# Patient Record
(demographics unavailable — no encounter records)

---

## 2025-01-16 NOTE — HISTORY OF PRESENT ILLNESS
[FreeTextEntry1] : Ms. Donovan is a 74 year old female with a history of abnormal CAT scan, asthma, cough, GERD, LPR, LOREE, RLS, SOB, and tracheomalacia presenting to the office today for a follow up visit. Her chief complaint is   -she notes going to the hospital a few months ago due to dehydration  -she notes having palpitations and having to see a cardiologist  -she notes being hospitalized one month ago  -she notes having a CT done of her chest  -she notes being on a saline drip  -she notes feeling improved since  -she notes appetite is stable -she notes diet is good -she notes good quality of sleep -she notes sleeping for 9 hours  -she notes balance is stable -she notes going to PT  -she notes fatigue   -she denies any headaches, nausea, emesis, fever, chills, sweats, chest pain, chest pressure, coughing, wheezing, diarrhea, constipation, dysphagia, arthralgias, myalgias, leg swelling, itchy eyes, itchy ears, heartburn, reflux, or sour taste in the mouth.

## 2025-01-16 NOTE — ADDENDUM
[FreeTextEntry1] : Documented by Raghavendra Santos acting as a scribe for Dr. Morales Juarez on 01/16/2025. All medical record entries made by the Scribe were at my, Dr. Morales Juarez's, direction and personally dictated by me on 01/16/2025. I have reviewed the chart and agree that the record accurately reflects my personal performance of the history, physical exam, assessment and plan. I have also personally directed, reviewed, and agree with the discharge instructions.

## 2025-01-16 NOTE — ASSESSMENT
[FreeTextEntry1] : Ms. Donovan is a 74 year old female who has a history of hypothyroidism, ?osteopenia, asthma, allergies, GERD, bronchomalacia, obesity, Covid 19 5/2022, and is currently stable from a pulmonary perspective. s/p post fall / poor balance - s/p vertigo illness 12/2024 - stable   Her shortness of breath is multifactorial due to: -tracheomalacia, s/p tracheoplasty -asthma -obesity / out of shape -secondary reflux / GERD -persistent cough  Her cough is felt to be related to: -tracheomalacia -asthma -secondary GERD -post nasal drip syndrome  problem 1: asthma (Active s/p Sinusitis) - resolved  -s/p a short course of Prednisone; 20 mg for 7 days, then 10 mg for 7 days (6/2021), 5/2024 Information sheet given to the patient to be reviewed, this medication is never to be used without consulting the prescribing physician. Proper dietary restraint is necessary specifically salt containing foods, if any reaction may occur should be reported. -continue Breo Ellipta 200 at 1 inhalation QD or Symbicort 160 2 inhalations BID -continue Pulmicort 2 inhalations BID after using the Breo -continue to use Singulair 10 mg before bed -continue to use ProAir PRN -continue Albuterol via Hand held nebulizer, Q6H -Asthma is believed to be caused by inherited (genetic) and environmental factor, but its exact cause is unknown. Asthma may be triggered by allergens, lung infections, or irritants in the air. Asthma triggers are different for each person -Inhaler technique reviewed as well as oral hygiene techniques reviewed with patient. Avoidance of cold air, extremes of temperature, rescue inhaler should be used before exercise. Order of medication reviewed with patient. Recommended use of a cool mist humidifier in the bedroom.  problem 2: post nasal drip syndrome (controlled) -continue to use Xyzal 5 mg before bed -continue to use Astelin 1 sniff each nostril BID (0.15) -recommended Flonase Sensimyst 1 sniff/nostril BID -Environmental measures for allergies were encouraged including mattress and pillow cover, air purifier, and environmental controls.  problem 3: GERD -recommended to try organic apple cider vinegar before a meal -continue Pepcid 40 mg QHS BID -Rule of 2s: avoid eating too much, eating too late, eating too spicy, eating two hours before bed -Things to avoid including overeating, spicy foods, tight clothing, eating within three hours of bed, this list is not all inclusive. -For treatment of reflux, possible options discussed including diet control, H2 blockers, PPIs, as well as coating motility agents discussed as treatment options. Timing of meals and proximity of last meal to sleep were discussed. If symptoms persist, a formal gastrointestinal evaluation is needed.  problem 4: OSAS - refused Rx -recommended "Excite" -recommended to follow up with Dr. Willie Seo for Inspire Hypoglossal Nerve Stimulator -recommended to use "Chin Strap" / Somnifix -F/u with Dr. Domo Pierce and have her oral appliance adjusted -Sleep apnea is associated with adverse clinical consequences which an affect most organ systems. Cardiovascular disease risk includes arrhythmias, atrial fibrillation, hypertension, coronary artery disease, and stroke. Metabolic disorders include diabetes type 2, non-alcoholic fatty liver disease. Mood disorder especially depression; and cognitive decline especially in the elderly. Associations with chronic reflux/Taveras's esophagus some but not all inclusive. -Reasons to assess this include arousal consistent with hypopnea; respiratory events most prominent in REM sleep or supine position; therefore sleep staging and body position are important for accurate diagnosis and estimation of AHI.  problem 5: tracheomalacia - resolved / bronchomalacia -s/p tracheoplasty/bronchoplasty -recommended to follow up with Dr. Conner De La O  problem 6: obesity / out of shape (stable) -recommended Berberine OTC  -recommended Dr. Willie Ambrose 10 day detox -Weight loss, exercise, and diet control were discussed and are highly encouraged. Treatment options were given such as, aqua therapy, and contacting a nutritionist. Recommended to use the elliptical, stationary bike, less use of treadmill. Obesity is associated with worsening asthma, shortness of breath, and potential for cardiac disease, diabetes, and other underlying medical conditions.  problem 7: leg cramps - controlled -Recommend MyoSedate prior to cramps -Recommend Theraworx during cramps -Recommend Myocalm PM -recommended orthopedic follow up -continue Magnesium Oxide 400mg QD up to BID -continue to use CoQ10 200 mg QD -continue to use vitamin D 50,000 units every month  Problem 7A: poor balance  -recommend balance therapy   Problem 7B: leg Pain- ? Statin -Hold Crestin x 2 wk -Grade symptoms -boost vitamin D-5000 mg and COq10 to 200 mg  problem 8: itchy ears -recommended to try Mometasone cream BID or Neosporin  Problem 9A: Fatigue - complete TFTs - complete blood work for iron studies, CBC -NAD/CoQ10 200 mg   problem 9: Health Maintenance/COVID19 Precautions: -recommended SaNOtize nasal spray -s/p Covid 19 5/2022 -S/p Covid 19 vaccine (Moderna) x3 - Clean your hands often. Wash your hands often with soap and water for at least 20 seconds, especially after blowing your nose, coughing, or sneezing, or having been in a public place. - If soap and water are not available, use a hand  that contains at least 60% alcohol. - To the extent possible, avoid touching high-touch surfaces in public places - elevator buttons, door handles, handrails, handshaking with people, etc. Use a tissue or your sleeve to cover your hand or finger if you must touch something. - Wash your hands after touching surfaces in public places. - Avoid touching your face, nose, eyes, etc. - Clean and disinfect your home to remove germs: practice routine cleaning of frequently touched surfaces (for example: tables, doorknobs, light switches, handles, desks, toilets, faucets, sinks & cell phones) - Avoid crowds, especially in poorly ventilated spaces. Your risk of exposure to respiratory viruses like COVID-19 may increase in crowded, closed-in settings with little air circulation if there are people in the crowd who are sick. All patients are recommended to practice social distancing and stay at least 6 feet away from others. - Avoid all non-essential travel including plane trips, and especially avoid embarking on cruise ships. -If COVID-19 is spreading in your community, take extra measures to put distance between yourself and other people to further reduce your risk of being exposed to this new virus. -Stay home as much as possible. - Consider ways of getting food brought to your house through family, social, or commercial networks -Be aware that the virus has been known to live in the air up to 3 hours post exposure, cardboard up to 24 hours post exposure, copper up to 4 hours post exposure, steel and plastic up to 2-3 days post exposure. Risk of transmission from these surfaces are affected by many variables. Immune Support Recommendations: -OTC Vitamin C 500mg BID -OTC Quercetin 250-500mg BID -OTC Zinc 75-100mg per day -OTC Melatonin 1 or 2 mg a night -OTC Vitamin D 1-4000mg per day -OTC Tonic Water 8oz per day  Asthma and COVID19:  You need to make sure your asthma is under control. This often requires the use of inhaled corticosteroids (and sometimes oral corticosteroids). Inhaled corticosteroids do not likely reduce your immune system's ability to fight infections, but oral corticosteroids may. It is important to use the steps above to protect yourself to limit your exposure to any respiratory virus.  Problem 9A: Fatigue -complete TFT's-(WNL) -complete bloodwork for iron stidues, CBC- (WNL)  problem 10: health maintenance -recommended RSV vaccine in the Fall for anyone over the age of 60 -Recommended Dr. Anglin -Recommended to see Emil Mcpherson for her right axillary "lipoma / lump" -s/o yearly flu shot after 2023 -recommended strep pneumonia vaccines: Prevnar-13 vaccine, followed by Pneumo vaccine 23 one year following -recommended early intervention for URIs -recommended regular osteoporosis evaluations -recommended early dermatological evaluations -recommended after the age of 50 to the age of 70, colonoscopy every 5 years  F/U in 4 months with [facility] and [facility]. She is encouraged to call with any changes, concerns, or questions. COUGH

## 2025-01-16 NOTE — REASON FOR VISIT
[Acute] : an acute visit [Spouse] : spouse [FreeTextEntry1] : abnormal CAT scan, asthma, cough, GERD, LPR, LOREE, RLS, SOB, and tracheomalacia

## 2025-01-16 NOTE — PHYSICAL EXAM
[No Acute Distress] : no acute distress [Normal Oropharynx] : normal oropharynx [III] : Mallampati Class: III [Normal Appearance] : normal appearance [No Neck Mass] : no neck mass [Normal Rate/Rhythm] : normal rate/rhythm [Normal S1, S2] : normal s1, s2 [No Murmurs] : no murmurs [No Resp Distress] : no resp distress [Clear to Auscultation Bilaterally] : clear to auscultation bilaterally [Wheeze] : wheeze [Kyphosis] : kyphosis [Benign] : benign [Normal Gait] : normal gait [No Clubbing] : no clubbing [No Cyanosis] : no cyanosis [FROM] : FROM [Normal Color/ Pigmentation] : normal color/ pigmentation [No Focal Deficits] : no focal deficits [Oriented x3] : oriented x3 [Normal Affect] : normal affect [TextBox_2] : OW [TextBox_68] : I:E 1:3; clear  [TextBox_105] : 1+  LE edema

## 2025-01-16 NOTE — PROCEDURE
[FreeTextEntry1] : Full PFT reveals normal flows; FEV1 was  2.01L which is 105% of predicted; normal lung volumes; normal diffusion at 4.04, which is 96% of predicted; normal flow volume loop. PFTs were performed to evaluate for SOB  FENO was 14; a normal value being less than 25 Fractional exhaled nitric oxide (FENO) is regarded as a simple, noninvasive method for assessing eosinophilic airway inflammation. Produced by a variety of cells within the lung, nitric oxide (NO) concentrations are generally low in healthy individuals. However, high concentrations of NO appear to be involved in nonspecific host defense mechanisms and chronic inflammatory diseases such as asthma. The American Thoracic Society (ATS) therefore has recommended using FENO to aid in the diagnosis and monitoring of eosinophilic airway inflammation and asthma, and for identifying steroid responsive individuals whose chronic respiratory symptoms may be caused by airway inflammation.   The American Thoracic Society (ATS) strongly recommends the use of FeNO measurement to aid in the assessment, management, and long-term monitoring of asthma. In their 2011 clinical practice guideline, the ATS emphasizes the importance of using FeNO.

## 2025-07-21 NOTE — PHYSICAL EXAM
[No Acute Distress] : no acute distress [Normal Oropharynx] : normal oropharynx [III] : Mallampati Class: III [No Neck Mass] : no neck mass [Normal Rate/Rhythm] : normal rate/rhythm [Normal S1, S2] : normal s1, s2 [No Murmurs] : no murmurs [No Resp Distress] : no resp distress [Clear to Auscultation Bilaterally] : clear to auscultation bilaterally [Wheeze] : wheeze [Kyphosis] : kyphosis [Benign] : benign [Normal Gait] : normal gait [No Clubbing] : no clubbing [No Cyanosis] : no cyanosis [FROM] : FROM [Normal Color/ Pigmentation] : normal color/ pigmentation [No Focal Deficits] : no focal deficits [Oriented x3] : oriented x3 [Normal Affect] : normal affect [TextBox_2] : OW [TextBox_44] : s/p neck surgery [TextBox_68] : I:E 1:3; clear  [TextBox_105] : 1+  LE edema

## 2025-07-21 NOTE — HISTORY OF PRESENT ILLNESS
[FreeTextEntry1] : Ms. Donovan is a 74-year-old female with a history of abnormal CAT scan, asthma, cough, GERD, LPR, LOREE, RLS, SOB, and tracheomalacia presenting to the office today for a follow-up pulmonary evaluation.   -she notes s/p thryoid and 2 parathyroid removal. The biopsy was benign -she notes s/p MRI of the abdomen, revealing she has a growing pancreatic cyst -she notes feeling generally well -she notes energy levels are good -she notes bowels are regular -she notes vision is stable -she notes RLE ankle swelling -she notes snoring very badly -her  thinks she has a deviated septum and sleep apnea -she notes the oral appliance didn't work for her -she notes gaining weight -she notes she's now on 150 mcg Synthroid  -she denies any headaches, nausea, emesis, fever, chills, sweats, chest pain, chest pressure, diarrhea, constipation, dysphagia, coughing, wheezing, palpitations, vertigo, arthralgias, myalgias, itchy eyes, itchy ears, heartburn, reflux, or sour taste in the mouth.

## 2025-07-21 NOTE — RESULTS/DATA
[TextEntry] : MRI of abdomen (2025) revealed 2.4 cm pancreatic body cyst. Prior size was 1.5 cm in 6/2021 with interval grooving, c/w "worrisome features."

## 2025-07-21 NOTE — PROCEDURE
[FreeTextEntry1] : PFTs revealed normal flows; FEV1 was 2.02L, which is 106% of predicted; normal flow volume loop. PFTs were performed to evaluate for SOB  FENO was 12; a normal value being less than 25 Fractional exhaled nitric oxide (FENO) is regarded as a simple, noninvasive method for assessing eosinophilic airway inflammation. Produced by a variety of cells within the lung, nitric oxide (NO) concentrations are generally low in healthy individuals. However, high concentrations of NO appear to be involved in nonspecific host defense mechanisms and chronic inflammatory diseases such as asthma. The American Thoracic Society (ATS) therefore has recommended using FENO to aid in the diagnosis and monitoring of eosinophilic airway inflammation and asthma, and for identifying steroid responsive individuals whose chronic respiratory symptoms may be caused by airway inflammation.

## 2025-07-21 NOTE — ADDENDUM
[FreeTextEntry1] : Documented by Annamarie Bravo acting as a scribe for Dr. Morales Juarez on 07/21/2025. All medical record entries made by the Scribe were at my, Dr. Morales Juarez's, direction and personally dictated by me on 07/21/2025. I have reviewed the chart and agree that the record accurately reflects my personal performance of the history, physical exam, assessment and plan. I have also personally directed, reviewed, and agree with the discharge instructions.

## 2025-07-21 NOTE — ASSESSMENT
[FreeTextEntry1] : Ms. Donovan is a 74-year-old female who has a history of hypothyroidism, ?osteopenia, asthma, allergies, GERD, bronchomalacia, obesity, Covid 19 5/2022- s/p post fall / poor balance - s/p vertigo illness 12/2024 - stable except for pancreatic cyst/untreated LOREE  Her shortness of breath is multifactorial due to: -tracheomalacia, s/p tracheoplasty -asthma -obesity / out of shape -secondary reflux / GERD -persistent cough  Her cough is felt to be related to: -tracheomalacia -asthma -secondary GERD -post nasal drip syndrome  problem 1: asthma (Active s/p Sinusitis) - resolved  -s/p a short course of Prednisone; 20 mg for 7 days, then 10 mg for 7 days (6/2021), 5/2024 Information sheet given to the patient to be reviewed, this medication is never to be used without consulting the prescribing physician. Proper dietary restraint is necessary specifically salt containing foods, if any reaction may occur should be reported. -continue Breo Ellipta 200 at 1 inhalation QD or Symbicort 160 2 inhalations BID -continue Pulmicort 2 inhalations BID after using the Breo -continue to use Singulair 10 mg before bed -continue to use ProAir PRN -continue Albuterol via Hand held nebulizer, Q6H -Asthma is believed to be caused by inherited (genetic) and environmental factor, but its exact cause is unknown. Asthma may be triggered by allergens, lung infections, or irritants in the air. Asthma triggers are different for each person -Inhaler technique reviewed as well as oral hygiene techniques reviewed with patient. Avoidance of cold air, extremes of temperature, rescue inhaler should be used before exercise. Order of medication reviewed with patient. Recommended use of a cool mist humidifier in the bedroom.  problem 2: post nasal drip syndrome (controlled) -continue to use Xyzal 5 mg before bed -continue to use Astelin 1 sniff each nostril BID (0.15) -recommended Flonase Sensimyst 1 sniff/nostril BID -Environmental measures for allergies were encouraged including mattress and pillow cover, air purifier, and environmental controls.  problem 3: GERD -recommended to try organic apple cider vinegar before a meal -continue Pepcid 40 mg QHS BID -Rule of 2s: avoid eating too much, eating too late, eating too spicy, eating two hours before bed -Things to avoid including overeating, spicy foods, tight clothing, eating within three hours of bed, this list is not all inclusive. -For treatment of reflux, possible options discussed including diet control, H2 blockers, PPIs, as well as coating motility agents discussed as treatment options. Timing of meals and proximity of last meal to sleep were discussed. If symptoms persist, a formal gastrointestinal evaluation is needed.  Problem 3A: pancreatic cyst -continue GI follow-up 7/2025  problem 4: OSAS - refused Rx -recommended "Excite" -recommended to follow up with Dr. Elmer Scherer et al. for Inspire Hypoglossal Nerve Stimulator -recommended to use "Chin Strap" / Somnifix -s/p f/p with Dr. Domo Pierce and have her oral appliance adjusted -Sleep apnea is associated with adverse clinical consequences which an affect most organ systems. Cardiovascular disease risk includes arrhythmias, atrial fibrillation, hypertension, coronary artery disease, and stroke. Metabolic disorders include diabetes type 2, non-alcoholic fatty liver disease. Mood disorder especially depression; and cognitive decline especially in the elderly. Associations with chronic reflux/Taveras's esophagus some but not all inclusive. -Reasons to assess this include arousal consistent with hypopnea; respiratory events most prominent in REM sleep or supine position; therefore sleep staging and body position are important for accurate diagnosis and estimation of AHI.  problem 5: tracheomalacia - resolved / bronchomalacia -s/p tracheoplasty/bronchoplasty -recommended to follow up with Dr. Conner De La O  problem 6: obesity / out of shape (stable) -recommended Berberine OTC  -recommended Dr. Willie Ambrose 10 day detox -Weight loss, exercise, and diet control were discussed and are highly encouraged. Treatment options were given such as, aqua therapy, and contacting a nutritionist. Recommended to use the elliptical, stationary bike, less use of treadmill. Obesity is associated with worsening asthma, shortness of breath, and potential for cardiac disease, diabetes, and other underlying medical conditions.  problem 7: leg cramps - controlled -Recommend MyoSedate prior to cramps -Recommend Theraworx during cramps -Recommend Myocalm PM -recommended orthopedic follow up -continue Magnesium Oxide 400mg QD up to BID -continue to use CoQ10 200 mg QD -continue to use vitamin D 50,000 units every month  Problem 7A: poor balance  -recommend balance therapy   Problem 7B: leg Pain- ? Statin -Hold Crestin x 2 wk -Grade symptoms -boost vitamin D-5000 mg and COq10 to 200 mg  problem 8: itchy ears -recommended to try Mometasone cream BID or Neosporin  Problem 9A: Fatigue - complete TFTs - complete blood work for iron studies, CBC -NAD/CoQ10 200 mg   problem 9: Health Maintenance/COVID19 Precautions: -recommended SaNOtize nasal spray -s/p Covid 19 5/2022 -S/p Covid 19 vaccine (Moderna) x3 - Clean your hands often. Wash your hands often with soap and water for at least 20 seconds, especially after blowing your nose, coughing, or sneezing, or having been in a public place. - If soap and water are not available, use a hand  that contains at least 60% alcohol. - To the extent possible, avoid touching high-touch surfaces in public places - elevator buttons, door handles, handrails, handshaking with people, etc. Use a tissue or your sleeve to cover your hand or finger if you must touch something. - Wash your hands after touching surfaces in public places. - Avoid touching your face, nose, eyes, etc. - Clean and disinfect your home to remove germs: practice routine cleaning of frequently touched surfaces (for example: tables, doorknobs, light switches, handles, desks, toilets, faucets, sinks & cell phones) - Avoid crowds, especially in poorly ventilated spaces. Your risk of exposure to respiratory viruses like COVID-19 may increase in crowded, closed-in settings with little air circulation if there are people in the crowd who are sick. All patients are recommended to practice social distancing and stay at least 6 feet away from others. - Avoid all non-essential travel including plane trips, and especially avoid embarking on cruise ships. -If COVID-19 is spreading in your community, take extra measures to put distance between yourself and other people to further reduce your risk of being exposed to this new virus. -Stay home as much as possible. - Consider ways of getting food brought to your house through family, social, or commercial networks -Be aware that the virus has been known to live in the air up to 3 hours post exposure, cardboard up to 24 hours post exposure, copper up to 4 hours post exposure, steel and plastic up to 2-3 days post exposure. Risk of transmission from these surfaces are affected by many variables. Immune Support Recommendations: -OTC Vitamin C 500mg BID -OTC Quercetin 250-500mg BID -OTC Zinc 75-100mg per day -OTC Melatonin 1 or 2 mg a night -OTC Vitamin D 1-4000mg per day -OTC Tonic Water 8oz per day  Asthma and COVID19:  You need to make sure your asthma is under control. This often requires the use of inhaled corticosteroids (and sometimes oral corticosteroids). Inhaled corticosteroids do not likely reduce your immune system's ability to fight infections, but oral corticosteroids may. It is important to use the steps above to protect yourself to limit your exposure to any respiratory virus.  Problem 9A: Fatigue -complete TFT's-(WNL) -complete bloodwork for iron stidues, CBC- (WNL)  problem 10: health maintenance -recommended RSV vaccine in the Fall for anyone over the age of 60 -Recommended Dr. German -Recommended to see Emil Mcpherson for her right axillary "lipoma / lump" -s/o yearly flu shot after 2023 -recommended strep pneumonia vaccines: Prevnar-13 vaccine, followed by Pneumo vaccine 23 one year following -recommended early intervention for URIs -recommended regular osteoporosis evaluations -recommended early dermatological evaluations -recommended after the age of 50 to the age of 70, colonoscopy every 5 years  F/P in 4 months with [facility] and [facility]. She is encouraged to call with any changes, concerns, or questions.

## 2025-07-21 NOTE — REASON FOR VISIT
[Follow-Up] : a follow-up visit [Spouse] : spouse [[id number]] : abnormal CAT scan, asthma, cough, GERD, LPR, LOREE, RLS, SOB, and tracheomalacia

## 2025-07-28 NOTE — REASON FOR VISIT
[Home] : at home, [unfilled] , at the time of the visit. [Medical Office: ([facility])___] : at the medical office located in  [Telehealth (audio & video)] : This visit was provided via telehealth using real-time 2-way audio visual technology. [Verbal consent obtained from patient] : the patient, [unfilled] [Consultation] : a consultation visit

## 2025-07-28 NOTE — REASON FOR VISIT
[Home] : at home, [unfilled] , at the time of the visit. [Medical Office: ([facility])___] : at the medical office located in  [Telehealth (audio & video)] : This visit was provided via telehealth using real-time 2-way audio visual technology. [Verbal consent obtained from patient] : the patient, [unfilled] [Consultation] : a consultation visit

## 2025-07-29 NOTE — ASSESSMENT
[FreeTextEntry1] : 73 y/o female with a 2.4 cm pancreas cyst showing increase in size since 2021 who is recommended to have endoscopic ultrasound for further evaluation of pancreas cyst to r/o any worrisome feature.    We discussed the proposed recommended procedure, preparation and alternatives.  The risks (bleeding, perforation, infection) and benefits were discussed with the patient in details.  The patient understands the risks/benefits and agreed to proceed with procedure. Advised patient she will need PST prior to procedure for prolonged sedation.   Plan 1. EUS with Dr Ortiz on 10/3/2025 2. Instructions emailed.   I spent 15 minutes reviewing this patient's records and 16 minutes in face to face during this visit. All questions answered.  Patient to call me with any questions.

## 2025-07-29 NOTE — HISTORY OF PRESENT ILLNESS
[FreeTextEntry1] : 7/17/2025 - 2.4 cm pancreatic body cyst, previously 1.5 cm on 6/19/2021, with interval growth considered a worrisome feature.